# Patient Record
Sex: FEMALE | Race: WHITE | NOT HISPANIC OR LATINO
[De-identification: names, ages, dates, MRNs, and addresses within clinical notes are randomized per-mention and may not be internally consistent; named-entity substitution may affect disease eponyms.]

---

## 2018-02-15 ENCOUNTER — APPOINTMENT (OUTPATIENT)
Dept: SURGERY | Facility: CLINIC | Age: 83
End: 2018-02-15
Payer: MEDICARE

## 2018-02-15 VITALS
SYSTOLIC BLOOD PRESSURE: 153 MMHG | BODY MASS INDEX: 20.38 KG/M2 | HEIGHT: 63 IN | HEART RATE: 69 BPM | DIASTOLIC BLOOD PRESSURE: 79 MMHG | WEIGHT: 115 LBS | TEMPERATURE: 97.9 F

## 2018-02-15 PROCEDURE — 99203 OFFICE O/P NEW LOW 30 MIN: CPT

## 2018-03-01 ENCOUNTER — APPOINTMENT (OUTPATIENT)
Dept: SURGERY | Facility: CLINIC | Age: 83
End: 2018-03-01
Payer: MEDICARE

## 2018-03-01 ENCOUNTER — OUTPATIENT (OUTPATIENT)
Dept: OUTPATIENT SERVICES | Facility: HOSPITAL | Age: 83
LOS: 1 days | End: 2018-03-01
Payer: MEDICAID

## 2018-03-01 VITALS
SYSTOLIC BLOOD PRESSURE: 109 MMHG | HEIGHT: 63 IN | WEIGHT: 115 LBS | HEART RATE: 68 BPM | DIASTOLIC BLOOD PRESSURE: 67 MMHG | OXYGEN SATURATION: 98 % | TEMPERATURE: 98.6 F | BODY MASS INDEX: 20.38 KG/M2

## 2018-03-01 PROCEDURE — G9001: CPT

## 2018-03-01 PROCEDURE — 99213 OFFICE O/P EST LOW 20 MIN: CPT

## 2018-03-09 ENCOUNTER — OUTPATIENT (OUTPATIENT)
Dept: OUTPATIENT SERVICES | Facility: HOSPITAL | Age: 83
LOS: 1 days | End: 2018-03-09
Payer: MEDICARE

## 2018-03-09 VITALS
HEART RATE: 81 BPM | TEMPERATURE: 98 F | SYSTOLIC BLOOD PRESSURE: 155 MMHG | RESPIRATION RATE: 18 BRPM | OXYGEN SATURATION: 98 % | HEIGHT: 60 IN | DIASTOLIC BLOOD PRESSURE: 77 MMHG

## 2018-03-09 DIAGNOSIS — C50.912 MALIGNANT NEOPLASM OF UNSPECIFIED SITE OF LEFT FEMALE BREAST: ICD-10-CM

## 2018-03-09 DIAGNOSIS — E78.5 HYPERLIPIDEMIA, UNSPECIFIED: ICD-10-CM

## 2018-03-09 DIAGNOSIS — Z01.818 ENCOUNTER FOR OTHER PREPROCEDURAL EXAMINATION: ICD-10-CM

## 2018-03-09 DIAGNOSIS — I10 ESSENTIAL (PRIMARY) HYPERTENSION: ICD-10-CM

## 2018-03-09 DIAGNOSIS — E89.0 POSTPROCEDURAL HYPOTHYROIDISM: Chronic | ICD-10-CM

## 2018-03-09 DIAGNOSIS — C50.012 MALIGNANT NEOPLASM OF NIPPLE AND AREOLA, LEFT FEMALE BREAST: ICD-10-CM

## 2018-03-09 DIAGNOSIS — E03.9 HYPOTHYROIDISM, UNSPECIFIED: ICD-10-CM

## 2018-03-09 DIAGNOSIS — N63.10 UNSPECIFIED LUMP IN THE RIGHT BREAST, UNSPECIFIED QUADRANT: ICD-10-CM

## 2018-03-09 LAB — BLD GP AB SCN SERPL QL: SIGNIFICANT CHANGE UP

## 2018-03-09 PROCEDURE — G0463: CPT

## 2018-03-09 PROCEDURE — 86900 BLOOD TYPING SEROLOGIC ABO: CPT

## 2018-03-09 PROCEDURE — 86901 BLOOD TYPING SEROLOGIC RH(D): CPT

## 2018-03-09 PROCEDURE — 86850 RBC ANTIBODY SCREEN: CPT

## 2018-03-09 NOTE — H&P PST ADULT - PRO PAIN LIFE ADAPT
----- Message from Samira Garcia MD sent at 3/9/2018 10:34 AM CST -----  Ready for chemo school and chemo with [facility] for stage 3 DLBCL  Thanks    decreased activity level

## 2018-03-09 NOTE — H&P PST ADULT - NEGATIVE CARDIOVASCULAR SYMPTOMS
no orthopnea/no paroxysmal nocturnal dyspnea/no peripheral edema/no claudication/no palpitations/no chest pain/no dyspnea on exertion

## 2018-03-09 NOTE — H&P PST ADULT - NEGATIVE GASTROINTESTINAL SYMPTOMS
no nausea/no change in bowel habits/no flatulence/no abdominal pain/no vomiting/no diarrhea/no constipation

## 2018-03-09 NOTE — H&P PST ADULT - GASTROINTESTINAL DETAILS
nontender/bowel sounds normal/no rebound tenderness/no distention/normal/no bruit/no masses palpable/soft

## 2018-03-09 NOTE — H&P PST ADULT - RS GEN PE MLT RESP DETAILS PC
breath sounds equal/no rales/clear to auscultation bilaterally/no chest wall tenderness/no rhonchi/normal/respirations non-labored/no wheezes/no subcutaneous emphysema/no intercostal retractions/good air movement/airway patent

## 2018-03-09 NOTE — H&P PST ADULT - HISTORY OF PRESENT ILLNESS
84 yr old [place] female with PMH of Hypertension, hyperlipidemia, hypothyroidism due to total thyroidectomy in 2007 secondary to thyroid cancer presents with c/o left breast cancer and right breast mass. Pt reports left breast mass pain with palpation. Pt for left breast biopsy, possible modified radical left breast mastectomy and excision of right breast mass with spot localization on 3/14/2018.

## 2018-03-09 NOTE — H&P PST ADULT - PMH
HLD (hyperlipidemia)    HTN (hypertension)    Hypothyroidism (acquired)    Papillary thyroid carcinoma Breast cancer, left    HLD (hyperlipidemia)    HTN (hypertension)    Hypothyroidism (acquired)    Papillary thyroid carcinoma Breast cancer, left    Breast mass, right    HLD (hyperlipidemia)    HTN (hypertension)    Hypothyroidism (acquired)    Papillary thyroid carcinoma

## 2018-03-09 NOTE — H&P PST ADULT - PROBLEM SELECTOR PLAN 4
Continue synthroid and take with sips of water on day of surgery preop. Follow-up with PCP postop for thyroid management.

## 2018-03-09 NOTE — H&P PST ADULT - PROBLEM SELECTOR PLAN 3
Continue antihypertensive med and take with sips of water on day of surgery preop. Follow-up with PCP postop for BP management

## 2018-03-09 NOTE — H&P PST ADULT - NEGATIVE SKIN SYMPTOMS
no rash/no change in size/color of mole/no pitted nails/no dryness/no tumor/no brittle nails/no hair loss/no itching

## 2018-03-09 NOTE — H&P PST ADULT - ASSESSMENT
84 yr old [place]aur[place] female with PMH of Hypertension, hyperlipidemia, hypothyroidism due to total thyroidectomy in 2007 secondary to thyroid cancer presents with left breast cancer and right breast mass. Pt is scheduled for left breast biopsy, possible modified radical left breast mastectomy and excision of right breast mass with spot localization on 3/14/2018. Pt is at moderate risk for procedure.

## 2018-03-14 ENCOUNTER — TRANSCRIPTION ENCOUNTER (OUTPATIENT)
Age: 83
End: 2018-03-14

## 2018-03-14 ENCOUNTER — OUTPATIENT (OUTPATIENT)
Dept: OUTPATIENT SERVICES | Facility: HOSPITAL | Age: 83
LOS: 1 days | End: 2018-03-14
Payer: MEDICARE

## 2018-03-14 ENCOUNTER — RESULT REVIEW (OUTPATIENT)
Age: 83
End: 2018-03-14

## 2018-03-14 VITALS
DIASTOLIC BLOOD PRESSURE: 60 MMHG | RESPIRATION RATE: 18 BRPM | TEMPERATURE: 97 F | SYSTOLIC BLOOD PRESSURE: 117 MMHG | OXYGEN SATURATION: 100 % | HEART RATE: 70 BPM

## 2018-03-14 VITALS
TEMPERATURE: 98 F | HEART RATE: 68 BPM | OXYGEN SATURATION: 99 % | RESPIRATION RATE: 17 BRPM | HEIGHT: 60 IN | DIASTOLIC BLOOD PRESSURE: 68 MMHG | SYSTOLIC BLOOD PRESSURE: 144 MMHG

## 2018-03-14 DIAGNOSIS — E89.0 POSTPROCEDURAL HYPOTHYROIDISM: Chronic | ICD-10-CM

## 2018-03-14 DIAGNOSIS — C50.012 MALIGNANT NEOPLASM OF NIPPLE AND AREOLA, LEFT FEMALE BREAST: ICD-10-CM

## 2018-03-14 DIAGNOSIS — Z01.818 ENCOUNTER FOR OTHER PREPROCEDURAL EXAMINATION: ICD-10-CM

## 2018-03-14 PROCEDURE — 88331 PATH CONSLTJ SURG 1 BLK 1SPC: CPT

## 2018-03-14 PROCEDURE — 76098 X-RAY EXAM SURGICAL SPECIMEN: CPT | Mod: 26

## 2018-03-14 PROCEDURE — 19120 REMOVAL OF BREAST LESION: CPT | Mod: 50

## 2018-03-14 PROCEDURE — 19285 PERQ DEV BREAST 1ST US IMAG: CPT | Mod: RT

## 2018-03-14 PROCEDURE — C1889: CPT

## 2018-03-14 PROCEDURE — 88307 TISSUE EXAM BY PATHOLOGIST: CPT | Mod: 26

## 2018-03-14 PROCEDURE — 19281 PERQ DEVICE BREAST 1ST IMAG: CPT

## 2018-03-14 PROCEDURE — 88305 TISSUE EXAM BY PATHOLOGIST: CPT | Mod: 26

## 2018-03-14 PROCEDURE — 88334 PATH CONSLTJ SURG CYTO XM EA: CPT | Mod: 26,59

## 2018-03-14 PROCEDURE — 19120 REMOVAL OF BREAST LESION: CPT | Mod: LT

## 2018-03-14 PROCEDURE — 76098 X-RAY EXAM SURGICAL SPECIMEN: CPT

## 2018-03-14 PROCEDURE — 88331 PATH CONSLTJ SURG 1 BLK 1SPC: CPT | Mod: 26

## 2018-03-14 PROCEDURE — 19285 PERQ DEV BREAST 1ST US IMAG: CPT

## 2018-03-14 PROCEDURE — 88333 PATH CONSLTJ SURG CYTO XM 1: CPT

## 2018-03-14 PROCEDURE — 19125 EXCISION BREAST LESION: CPT | Mod: RT

## 2018-03-14 PROCEDURE — 88305 TISSUE EXAM BY PATHOLOGIST: CPT

## 2018-03-14 PROCEDURE — 88307 TISSUE EXAM BY PATHOLOGIST: CPT

## 2018-03-14 RX ORDER — ONDANSETRON 8 MG/1
4 TABLET, FILM COATED ORAL EVERY 6 HOURS
Qty: 0 | Refills: 0 | Status: DISCONTINUED | OUTPATIENT
Start: 2018-03-14 | End: 2018-03-22

## 2018-03-14 RX ORDER — IBUPROFEN 200 MG
400 TABLET ORAL EVERY 8 HOURS
Qty: 0 | Refills: 0 | Status: DISCONTINUED | OUTPATIENT
Start: 2018-03-14 | End: 2018-03-22

## 2018-03-14 RX ORDER — SODIUM CHLORIDE 9 MG/ML
3 INJECTION INTRAMUSCULAR; INTRAVENOUS; SUBCUTANEOUS EVERY 8 HOURS
Qty: 0 | Refills: 0 | Status: DISCONTINUED | OUTPATIENT
Start: 2018-03-14 | End: 2018-03-14

## 2018-03-14 RX ORDER — HYDROMORPHONE HYDROCHLORIDE 2 MG/ML
0.5 INJECTION INTRAMUSCULAR; INTRAVENOUS; SUBCUTANEOUS
Qty: 0 | Refills: 0 | Status: DISCONTINUED | OUTPATIENT
Start: 2018-03-14 | End: 2018-03-14

## 2018-03-14 RX ORDER — SODIUM CHLORIDE 9 MG/ML
1000 INJECTION, SOLUTION INTRAVENOUS
Qty: 0 | Refills: 0 | Status: DISCONTINUED | OUTPATIENT
Start: 2018-03-14 | End: 2018-03-14

## 2018-03-14 RX ORDER — ONDANSETRON 8 MG/1
4 TABLET, FILM COATED ORAL ONCE
Qty: 0 | Refills: 0 | Status: DISCONTINUED | OUTPATIENT
Start: 2018-03-14 | End: 2018-03-14

## 2018-03-14 RX ORDER — OXYCODONE AND ACETAMINOPHEN 5; 325 MG/1; MG/1
1 TABLET ORAL EVERY 6 HOURS
Qty: 0 | Refills: 0 | Status: DISCONTINUED | OUTPATIENT
Start: 2018-03-14 | End: 2018-03-14

## 2018-03-14 NOTE — BRIEF OPERATIVE NOTE - POST-OP DX
Breast cyst, left  03/14/2018    Active  Aristeo Javier  Breast lesion on mammography  03/14/2018    Active  Aristeo Javier

## 2018-03-14 NOTE — ASU PATIENT PROFILE, ADULT - PMH
Breast cancer, left    Breast mass, right    HLD (hyperlipidemia)    HTN (hypertension)    Hypothyroidism (acquired)    Papillary thyroid carcinoma

## 2018-03-14 NOTE — ASU DISCHARGE PLAN (ADULT/PEDIATRIC). - NOTIFY
Fever greater than 101/Unable to Urinate/Inability to Tolerate Liquids or Foods/Numbness, color, or temperature change to extremity/Pain not relieved by Medications/Bleeding that does not stop/Swelling that continues/Persistent Nausea and Vomiting

## 2018-03-14 NOTE — ASU DISCHARGE PLAN (ADULT/PEDIATRIC). - MEDICATION SUMMARY - MEDICATIONS TO TAKE
I will START or STAY ON the medications listed below when I get home from the hospital:    Aspirin Enteric Coated 81 mg oral delayed release tablet  -- 1 tab(s) by mouth once a day  -- Indication: For As previously prescribed    Tylenol 325 mg oral tablet  -- 2 tab(s) by mouth every 4 hours, As Needed  -- Indication: For As previously prescribed    atorvastatin 10 mg oral tablet  -- 1 tab(s) by mouth once a day  -- Indication: For As previously prescribed    Metoprolol Succinate ER 25 mg oral tablet, extended release  -- 1 tab(s) by mouth once a day  -- Indication: For As previously prescribed    Synthroid 125 mcg (0.125 mg) oral tablet  -- 1 tab(s) by mouth once a day  -- Indication: For As previously prescribed

## 2018-03-14 NOTE — ASU DISCHARGE PLAN (ADULT/PEDIATRIC). - NURSING INSTRUCTIONS
Keep follow up appointment. Keep follow up appointment. keep dressing dry, clean, intact, for 2 days. After 2 days, remove dressing and leave steri strips on. You can shower with steri strips on. If it fall off after shower is OK, if not you leave it there, it will fall off by itself in 2-3 days.

## 2018-03-14 NOTE — BRIEF OPERATIVE NOTE - PROCEDURE
<<-----Click on this checkbox to enter Procedure Biopsy of breast with frozen section  03/14/2018  Left  Active  CFUNFGELD  Lumpectomy of breast with needle localization  03/14/2018  Right  Active  CFUNFGELD

## 2018-03-15 ENCOUNTER — APPOINTMENT (OUTPATIENT)
Dept: SURGERY | Facility: CLINIC | Age: 83
End: 2018-03-15
Payer: MEDICARE

## 2018-03-15 ENCOUNTER — EMERGENCY (EMERGENCY)
Facility: HOSPITAL | Age: 83
LOS: 1 days | Discharge: HOME | End: 2018-03-15
Attending: EMERGENCY MEDICINE
Payer: MEDICARE

## 2018-03-15 VITALS
BODY MASS INDEX: 20.38 KG/M2 | SYSTOLIC BLOOD PRESSURE: 124 MMHG | OXYGEN SATURATION: 100 % | HEIGHT: 63 IN | DIASTOLIC BLOOD PRESSURE: 76 MMHG | TEMPERATURE: 98 F | WEIGHT: 115 LBS | HEART RATE: 91 BPM

## 2018-03-15 VITALS
WEIGHT: 130.07 LBS | RESPIRATION RATE: 16 BRPM | SYSTOLIC BLOOD PRESSURE: 152 MMHG | TEMPERATURE: 98 F | HEIGHT: 62 IN | DIASTOLIC BLOOD PRESSURE: 84 MMHG | OXYGEN SATURATION: 99 % | HEART RATE: 67 BPM

## 2018-03-15 VITALS
TEMPERATURE: 99 F | RESPIRATION RATE: 18 BRPM | SYSTOLIC BLOOD PRESSURE: 145 MMHG | OXYGEN SATURATION: 99 % | HEART RATE: 65 BPM | DIASTOLIC BLOOD PRESSURE: 78 MMHG

## 2018-03-15 DIAGNOSIS — T81.31XA DISRUPTION OF EXTERNAL OPERATION (SURGICAL) WOUND, NOT ELSEWHERE CLASSIFIED, INITIAL ENCOUNTER: ICD-10-CM

## 2018-03-15 DIAGNOSIS — E89.0 POSTPROCEDURAL HYPOTHYROIDISM: Chronic | ICD-10-CM

## 2018-03-15 PROCEDURE — 99284 EMERGENCY DEPT VISIT MOD MDM: CPT

## 2018-03-15 PROCEDURE — 99283 EMERGENCY DEPT VISIT LOW MDM: CPT | Mod: 25

## 2018-03-15 PROCEDURE — 99024 POSTOP FOLLOW-UP VISIT: CPT

## 2018-03-15 RX ORDER — CEPHALEXIN 500 MG
1 CAPSULE ORAL
Qty: 14 | Refills: 0 | OUTPATIENT
Start: 2018-03-15 | End: 2018-03-21

## 2018-03-15 RX ORDER — CEPHALEXIN 500 MG
500 CAPSULE ORAL ONCE
Qty: 0 | Refills: 0 | Status: COMPLETED | OUTPATIENT
Start: 2018-03-15 | End: 2018-03-15

## 2018-03-15 RX ADMIN — Medication 500 MILLIGRAM(S): at 01:46

## 2018-03-15 NOTE — ED PROVIDER NOTE - PROGRESS NOTE DETAILS
Dr. Lee from surgery evaluated patient recommend no sutures at this time, keflex to prevent infection, and f/u with Dr. Dowell in office later today or tomorrow.   Discussed plan with patient and family. Patient stable for discharge.

## 2018-03-15 NOTE — ED ADULT TRIAGE NOTE - CHIEF COMPLAINT QUOTE
She had right breast surgery done today, patient pulled out sutures from surgical site, wound is open and bleeding

## 2018-03-15 NOTE — ED PROVIDER NOTE - OBJECTIVE STATEMENT
83 y/o F pt with PMHx of breast cancer, HTN, HLD, hypothyroid, PSHx POD # 0 s/p right lumpectomy, presents to ED after surgicals suture fell out. Pt reports that she was discharged home when she removed her dressing and showed. Pt states she noticed bleeding from the surgical wound. Pt denies fever, chills, redness, discharge,  or any other complaints. NKDA.

## 2018-03-15 NOTE — CONSULT NOTE ADULT - PROBLEM SELECTOR RECOMMENDATION 9
S/p Bilateral breast lumpectomy  Left breast incision intact  Right breast with small opening, no active bleed.    Bilateral incision cleansed with Chlorhexidine  Pressure dressings applied and taped bilaterally.  Keflex for prophylaxis.  Follow up Dr Dowell tomorrow.

## 2018-03-15 NOTE — ED PROVIDER NOTE - MEDICAL DECISION MAKING DETAILS
85 y/o F pt POD #0 after surgical suture fell out after breast lumpectomy. Will consult surgery and wound management.

## 2018-03-15 NOTE — CONSULT NOTE ADULT - SUBJECTIVE AND OBJECTIVE BOX
85 y/o F pt with PMHx of breast cancer, HTN, HLD, hypothyroid, PSHx POD # 0 s/p right and left lumpectomy, presents to ED after surgicals dressings were unintentionally removed.   Pt reports that she was discharged home when she removed her dressing and showed. Pt states she noticed bleeding from the surgical wound. Pt denies fever, chills, redness, discharge,  or any other complaints. NKDA.  Seen with family.      PAST MEDICAL & SURGICAL HISTORY:  Breast mass, right  Breast cancer, left  Hypothyroidism (acquired)  HLD (hyperlipidemia)  HTN (hypertension)  Papillary thyroid carcinoma  H/O total thyroidectomy: with central neck dissection on 11/26/2007      MEDICATIONS  (STANDING):    MEDICATIONS  (PRN):      Allergies    No Known Allergies    Intolerances        SOCIAL HISTORY:  No drug use    FAMILY HISTORY:  No pertinent family history in first degree relatives      REVIEW OF SYSTEMS:  CONSTITUTIONAL: In no acute distress.  EYES: No eye pain, visual disturbances, or discharge  ENMT:  No difficulty hearing, tinnitus, vertigo; No sinus or throat pain  NECK: No pain or stiffness  BREASTS: breast incisions  RESPIRATORY: No cough, wheezing, chills or hemoptysis; No shortness of breath  CARDIOVASCULAR: No chest pain, palpitations, dizziness, or leg swelling  GASTROINTESTINAL: No abdominal or epigastric pain. No nausea, vomiting, or hematemesis; No diarrhea or constipation. No melena or hematochezia.  GENITOURINARY: No dysuria, frequency, hematuria, or incontinence  NEUROLOGICAL: No headaches, memory loss, loss of strength, numbness, or tremors  SKIN: No itching, burning, rashes, or lesions   LYMPH NODES: No enlarged glands  ENDOCRINE: No heat or cold intolerance; No hair loss  MUSCULOSKELETAL: No joint pain or swelling; No muscle, back, or extremity pain  PSYCHIATRIC: No depression, anxiety, mood swings, or difficulty sleeping  HEME/LYMPH: No easy bruising, or bleeding gums  ALLERGY AND IMMUNOLOGIC: No hives or eczema      Vital Signs Last 24 Hrs  T(C): 36.7 (15 Mar 2018 00:06), Max: 36.7 (14 Mar 2018 14:32)  T(F): 98 (15 Mar 2018 00:06), Max: 98 (14 Mar 2018 14:32)  HR: 67 (15 Mar 2018 00:06) (67 - 97)  BP: 152/84 (15 Mar 2018 00:06) (112/58 - 152/84)  BP(mean): 72 (14 Mar 2018 16:23) (72 - 72)  RR: 16 (15 Mar 2018 00:06) (14 - 23)  SpO2: 99% (15 Mar 2018 00:06) (96% - 100%)    Daily Height in cm: 157.48 (15 Mar 2018 00:06)    Daily     PHYSICAL EXAM:  GENERAL: NAD, well-groomed, well-developed  HEAD:  Atraumatic, Normocephalic  EYES: EOMI, PERRL, conjunctiva and sclera clear  ENMT: Moist mucous membranes, Good dentition, No lesions  NECK: Supple, No JVD  NERVOUS SYSTEM:  Alert & Oriented X3, Good concentration  CHEST/LUNG: Clear to percussion bilaterally; Normal respiratory effort  HEART: Regular rate and rhythm  ABDOMEN: Soft, Nontender, Nondistended; Bowel sounds present  : normal external genitalia  BREASTS: left breast incision intact, no bleeding; right breast incision with small opening, no active bleeding.  EXTREMITIES:  No clubbing, cyanosis, or edema  VASC: equal peripheral pulses  LYMPH: No lymphadenopathy noted  SKIN: No rashes or lesions  PSYCH: normal affect    LABS:    Neutrophil %:               RADIOLOGY & ADDITIONAL STUDIES:

## 2018-03-20 PROBLEM — Z86.39 HISTORY OF THYROID DISORDER: Status: RESOLVED | Noted: 2018-02-15 | Resolved: 2018-03-20

## 2018-03-20 PROBLEM — Z86.79 HISTORY OF HYPERTENSION: Status: RESOLVED | Noted: 2018-02-15 | Resolved: 2018-03-20

## 2018-03-20 PROBLEM — Z86.39 HISTORY OF HIGH CHOLESTEROL: Status: RESOLVED | Noted: 2018-02-15 | Resolved: 2018-03-20

## 2018-03-20 RX ORDER — LEVOTHYROXINE SODIUM 137 UG/1
TABLET ORAL
Refills: 0 | Status: ACTIVE | COMMUNITY

## 2018-03-20 RX ORDER — ATORVASTATIN CALCIUM 80 MG/1
TABLET, FILM COATED ORAL
Refills: 0 | Status: ACTIVE | COMMUNITY

## 2018-03-20 RX ORDER — METOPROLOL TARTRATE 75 MG/1
TABLET, FILM COATED ORAL
Refills: 0 | Status: ACTIVE | COMMUNITY

## 2018-03-22 ENCOUNTER — APPOINTMENT (OUTPATIENT)
Dept: SURGERY | Facility: CLINIC | Age: 83
End: 2018-03-22
Payer: MEDICARE

## 2018-03-22 VITALS
SYSTOLIC BLOOD PRESSURE: 142 MMHG | HEART RATE: 75 BPM | BODY MASS INDEX: 20.38 KG/M2 | WEIGHT: 115 LBS | DIASTOLIC BLOOD PRESSURE: 82 MMHG | HEIGHT: 63 IN | TEMPERATURE: 98.5 F | OXYGEN SATURATION: 98 %

## 2018-03-22 DIAGNOSIS — Z86.39 PERSONAL HISTORY OF OTHER ENDOCRINE, NUTRITIONAL AND METABOLIC DISEASE: ICD-10-CM

## 2018-03-22 DIAGNOSIS — Z86.79 PERSONAL HISTORY OF OTHER DISEASES OF THE CIRCULATORY SYSTEM: ICD-10-CM

## 2018-03-22 PROCEDURE — 99024 POSTOP FOLLOW-UP VISIT: CPT

## 2018-03-23 LAB — SURGICAL PATHOLOGY FINAL REPORT - CH: SIGNIFICANT CHANGE UP

## 2018-04-12 DIAGNOSIS — R69 ILLNESS, UNSPECIFIED: ICD-10-CM

## 2018-04-16 ENCOUNTER — APPOINTMENT (OUTPATIENT)
Dept: SURGERY | Facility: CLINIC | Age: 83
End: 2018-04-16
Payer: MEDICARE

## 2018-04-16 DIAGNOSIS — Z87.898 PERSONAL HISTORY OF OTHER SPECIFIED CONDITIONS: ICD-10-CM

## 2018-04-16 PROCEDURE — 99024 POSTOP FOLLOW-UP VISIT: CPT

## 2018-04-27 ENCOUNTER — OUTPATIENT (OUTPATIENT)
Dept: OUTPATIENT SERVICES | Facility: HOSPITAL | Age: 83
LOS: 1 days | End: 2018-04-27
Payer: MEDICARE

## 2018-04-27 VITALS
HEIGHT: 60 IN | DIASTOLIC BLOOD PRESSURE: 84 MMHG | HEART RATE: 68 BPM | OXYGEN SATURATION: 98 % | WEIGHT: 111.99 LBS | TEMPERATURE: 98 F | SYSTOLIC BLOOD PRESSURE: 147 MMHG | RESPIRATION RATE: 18 BRPM

## 2018-04-27 DIAGNOSIS — E78.5 HYPERLIPIDEMIA, UNSPECIFIED: ICD-10-CM

## 2018-04-27 DIAGNOSIS — E03.9 HYPOTHYROIDISM, UNSPECIFIED: ICD-10-CM

## 2018-04-27 DIAGNOSIS — I10 ESSENTIAL (PRIMARY) HYPERTENSION: ICD-10-CM

## 2018-04-27 DIAGNOSIS — N63.0 UNSPECIFIED LUMP IN UNSPECIFIED BREAST: Chronic | ICD-10-CM

## 2018-04-27 DIAGNOSIS — N60.01 SOLITARY CYST OF RIGHT BREAST: ICD-10-CM

## 2018-04-27 DIAGNOSIS — Z01.818 ENCOUNTER FOR OTHER PREPROCEDURAL EXAMINATION: ICD-10-CM

## 2018-04-27 DIAGNOSIS — L81.9 DISORDER OF PIGMENTATION, UNSPECIFIED: ICD-10-CM

## 2018-04-27 DIAGNOSIS — E89.0 POSTPROCEDURAL HYPOTHYROIDISM: Chronic | ICD-10-CM

## 2018-04-27 PROCEDURE — G0463: CPT

## 2018-04-27 NOTE — H&P PST ADULT - PROBLEM SELECTOR PLAN 2
Continue antihypertensive meds and take with sips of water on day of surgery preop. Follow-up with PCP postop for BP management

## 2018-04-27 NOTE — H&P PST ADULT - HISTORY OF PRESENT ILLNESS
84 yr old female with PMH of Hypertension, hyperlipidemia, hypothyroidism due to total thyroidectomy in 2007 secondary to thyroid cancer presents with c/o pigmented lesion right breast. Pt for excision of right breast pigmented lesion on 5/3/2018.

## 2018-04-27 NOTE — H&P PST ADULT - NEGATIVE GASTROINTESTINAL SYMPTOMS
no change in bowel habits/no melena/no flatulence/no nausea/no diarrhea/no constipation/no abdominal pain/no vomiting

## 2018-04-27 NOTE — H&P PST ADULT - PMH
Breast cancer, left    Breast mass    Breast mass, right    HLD (hyperlipidemia)    HTN (hypertension)    Hypothyroidism (acquired)    Papillary thyroid carcinoma Breast cancer, left    Breast mass    Breast mass, right    HLD (hyperlipidemia)    HTN (hypertension)    Hypothyroidism (acquired)    Papillary thyroid carcinoma    Pigmented skin lesion  right breast

## 2018-04-27 NOTE — H&P PST ADULT - RS GEN PE MLT RESP DETAILS PC
clear to auscultation bilaterally/breath sounds equal/no rhonchi/no subcutaneous emphysema/no wheezes/good air movement/no chest wall tenderness/normal/airway patent/respirations non-labored/no intercostal retractions/no rales

## 2018-04-27 NOTE — H&P PST ADULT - PSH
Breast mass  Needle biopsy left breast mass and excision of left breast mass and excision of right breast mass spot localized on 3/14/2018  H/O total thyroidectomy  with central neck dissection on 11/26/2007

## 2018-04-27 NOTE — H&P PST ADULT - PROBLEM SELECTOR PLAN 3
Continue synthroid and take with sips of water on day of surgery preop. Follow-up with PCP postop for thyroid management

## 2018-04-27 NOTE — H&P PST ADULT - ENT GEN HX ROS MEA POS PC
H/O aortic valve repair    H/O mitral valve repair    H/O thyroidectomy    History of cholecystectomy    S/P CABG x 3 hearing difficulty

## 2018-04-27 NOTE — H&P PST ADULT - NEGATIVE CARDIOVASCULAR SYMPTOMS
no orthopnea/no peripheral edema/no chest pain/no paroxysmal nocturnal dyspnea/no claudication/no palpitations/no dyspnea on exertion

## 2018-04-27 NOTE — H&P PST ADULT - ASSESSMENT
84 yr old female with PMH of Hypertension, hyperlipidemia, hypothyroidism due to total thyroidectomy in 2007 secondary to thyroid cancer presents with pigmented lesion right breast. Pt for excision of right breast pigmented lesion on 5/3/2018. Pt is at low risk for procedure.

## 2018-05-02 ENCOUNTER — TRANSCRIPTION ENCOUNTER (OUTPATIENT)
Age: 83
End: 2018-05-02

## 2018-05-03 ENCOUNTER — OUTPATIENT (OUTPATIENT)
Dept: OUTPATIENT SERVICES | Facility: HOSPITAL | Age: 83
LOS: 1 days | End: 2018-05-03
Payer: MEDICARE

## 2018-05-03 ENCOUNTER — RESULT REVIEW (OUTPATIENT)
Age: 83
End: 2018-05-03

## 2018-05-03 VITALS
SYSTOLIC BLOOD PRESSURE: 138 MMHG | DIASTOLIC BLOOD PRESSURE: 64 MMHG | OXYGEN SATURATION: 100 % | RESPIRATION RATE: 16 BRPM | HEART RATE: 73 BPM | TEMPERATURE: 98 F

## 2018-05-03 VITALS
SYSTOLIC BLOOD PRESSURE: 152 MMHG | WEIGHT: 111.99 LBS | HEART RATE: 67 BPM | TEMPERATURE: 98 F | RESPIRATION RATE: 16 BRPM | OXYGEN SATURATION: 97 % | DIASTOLIC BLOOD PRESSURE: 70 MMHG

## 2018-05-03 DIAGNOSIS — E89.0 POSTPROCEDURAL HYPOTHYROIDISM: Chronic | ICD-10-CM

## 2018-05-03 DIAGNOSIS — N63.0 UNSPECIFIED LUMP IN UNSPECIFIED BREAST: Chronic | ICD-10-CM

## 2018-05-03 DIAGNOSIS — Z01.818 ENCOUNTER FOR OTHER PREPROCEDURAL EXAMINATION: ICD-10-CM

## 2018-05-03 DIAGNOSIS — N60.01 SOLITARY CYST OF RIGHT BREAST: ICD-10-CM

## 2018-05-03 PROCEDURE — 88305 TISSUE EXAM BY PATHOLOGIST: CPT

## 2018-05-03 PROCEDURE — 19120 REMOVAL OF BREAST LESION: CPT | Mod: RT

## 2018-05-03 PROCEDURE — 11402 EXC TR-EXT B9+MARG 1.1-2 CM: CPT | Mod: 58,RT

## 2018-05-03 PROCEDURE — 88305 TISSUE EXAM BY PATHOLOGIST: CPT | Mod: 26

## 2018-05-03 RX ORDER — ASPIRIN/CALCIUM CARB/MAGNESIUM 324 MG
1 TABLET ORAL
Qty: 0 | Refills: 0 | COMMUNITY

## 2018-05-03 RX ORDER — ONDANSETRON 8 MG/1
4 TABLET, FILM COATED ORAL EVERY 6 HOURS
Qty: 0 | Refills: 0 | Status: DISCONTINUED | OUTPATIENT
Start: 2018-05-03 | End: 2018-05-11

## 2018-05-03 RX ORDER — HYDROMORPHONE HYDROCHLORIDE 2 MG/ML
0.25 INJECTION INTRAMUSCULAR; INTRAVENOUS; SUBCUTANEOUS
Qty: 0 | Refills: 0 | Status: DISCONTINUED | OUTPATIENT
Start: 2018-05-03 | End: 2018-05-03

## 2018-05-03 RX ORDER — SODIUM CHLORIDE 9 MG/ML
1000 INJECTION, SOLUTION INTRAVENOUS
Qty: 0 | Refills: 0 | Status: DISCONTINUED | OUTPATIENT
Start: 2018-05-03 | End: 2018-05-03

## 2018-05-03 RX ORDER — LEVOTHYROXINE SODIUM 125 MCG
1 TABLET ORAL
Qty: 0 | Refills: 0 | COMMUNITY

## 2018-05-03 RX ORDER — SODIUM CHLORIDE 9 MG/ML
3 INJECTION INTRAMUSCULAR; INTRAVENOUS; SUBCUTANEOUS EVERY 8 HOURS
Qty: 0 | Refills: 0 | Status: DISCONTINUED | OUTPATIENT
Start: 2018-05-03 | End: 2018-05-03

## 2018-05-03 RX ORDER — OXYCODONE AND ACETAMINOPHEN 5; 325 MG/1; MG/1
1 TABLET ORAL EVERY 4 HOURS
Qty: 0 | Refills: 0 | Status: DISCONTINUED | OUTPATIENT
Start: 2018-05-03 | End: 2018-05-03

## 2018-05-03 RX ORDER — ONDANSETRON 8 MG/1
4 TABLET, FILM COATED ORAL ONCE
Qty: 0 | Refills: 0 | Status: DISCONTINUED | OUTPATIENT
Start: 2018-05-03 | End: 2018-05-03

## 2018-05-03 RX ORDER — ACETAMINOPHEN 500 MG
2 TABLET ORAL
Qty: 0 | Refills: 0 | COMMUNITY

## 2018-05-03 RX ORDER — ATORVASTATIN CALCIUM 80 MG/1
1 TABLET, FILM COATED ORAL
Qty: 0 | Refills: 0 | COMMUNITY

## 2018-05-03 RX ORDER — METOPROLOL TARTRATE 50 MG
1 TABLET ORAL
Qty: 0 | Refills: 0 | COMMUNITY

## 2018-05-03 RX ADMIN — SODIUM CHLORIDE 3 MILLILITER(S): 9 INJECTION INTRAMUSCULAR; INTRAVENOUS; SUBCUTANEOUS at 08:06

## 2018-05-03 NOTE — ASU DISCHARGE PLAN (ADULT/PEDIATRIC). - PAIN
Please take Tylenol or Motrin over the counter as needed every 6 hours and as directed on the back of the bottle./n/a

## 2018-05-03 NOTE — ASU DISCHARGE PLAN (ADULT/PEDIATRIC). - MEDICATION SUMMARY - MEDICATIONS TO TAKE
I will START or STAY ON the medications listed below when I get home from the hospital:    Aspirin Enteric Coated 81 mg oral delayed release tablet  -- 1 tab(s) by mouth once a day  -- Indication: For As previously prescribed    atorvastatin 10 mg oral tablet  -- 1 tab(s) by mouth once a day  -- Indication: For As previously prescribed    Metoprolol Succinate ER 25 mg oral tablet, extended release  -- 1 tab(s) by mouth once a day  -- Indication: For As previously prescribed    Synthroid 125 mcg (0.125 mg) oral tablet  -- 1 tab(s) by mouth once a day except on Fridays  -- Indication: For As previously prescribed

## 2018-05-03 NOTE — BRIEF OPERATIVE NOTE - PROCEDURE
<<-----Click on this checkbox to enter Procedure Excision of mass of chest wall  05/03/2018    Active  CFUNFGELD

## 2018-05-03 NOTE — ASU DISCHARGE PLAN (ADULT/PEDIATRIC). - SPECIAL INSTRUCTIONS
Please take Tylenol or Motrin over the counter as needed every 6 hours and as directed on the back of the bottle.

## 2018-05-03 NOTE — ASU DISCHARGE PLAN (ADULT/PEDIATRIC). - NOTIFY
Inability to Tolerate Liquids or Foods/Numbness, color, or temperature change to extremity/Bleeding that does not stop/Fever greater than 101/Swelling that continues/Persistent Nausea and Vomiting/Unable to Urinate/Pain not relieved by Medications

## 2018-05-03 NOTE — ASU PATIENT PROFILE, ADULT - PMH
Breast cancer, left    Breast mass    Breast mass, right    HLD (hyperlipidemia)    HTN (hypertension)    Hypothyroidism (acquired)    Papillary thyroid carcinoma    Pigmented skin lesion  right breast

## 2018-05-04 LAB — SURGICAL PATHOLOGY FINAL REPORT - CH: SIGNIFICANT CHANGE UP

## 2018-05-07 PROBLEM — L81.9 PIGMENTED SKIN LESION: Status: RESOLVED | Noted: 2018-05-07 | Resolved: 2018-05-07

## 2018-05-14 ENCOUNTER — APPOINTMENT (OUTPATIENT)
Dept: SURGERY | Facility: CLINIC | Age: 83
End: 2018-05-14
Payer: MEDICARE

## 2018-05-14 DIAGNOSIS — L81.9 DISORDER OF PIGMENTATION, UNSPECIFIED: ICD-10-CM

## 2018-05-14 PROCEDURE — 99024 POSTOP FOLLOW-UP VISIT: CPT

## 2018-07-17 PROBLEM — C73 MALIGNANT NEOPLASM OF THYROID GLAND: Chronic | Status: ACTIVE | Noted: 2018-03-09

## 2023-09-06 NOTE — ASU PATIENT PROFILE, ADULT - VISION (WITH CORRECTIVE LENSES IF THE PATIENT USUALLY WEARS THEM):
----- Message from Layla Akhtar sent at 9/6/2023  3:24 PM CDT -----  Regarding: med refill  .Type:  RX Refill Request    Who Called: pt  Refill or New Rx:refill  RX Name and Strength:cefdinir (OMNICEF) 300 MG capsule  How is the patient currently taking it? (ex. 1XDay):2xday  Is this a 30 day or 90 day RX:28  Preferred Pharmacy with phone number:[address]   Phone: [phone]  Fax: [phone]  Local or Mail Order:local  Ordering Provider:Salomón  Would the patient rather a call back or a response via [facility]? Call back  Best Call Back Number:[phone]  Additional Information: med refill        Normal vision: sees adequately in most situations; can see medication labels, newsprint

## 2024-05-01 NOTE — H&P PST ADULT - NEGATIVE BREAST SYMPTOMS

## 2025-06-26 NOTE — H&P PST ADULT - SYMPTOMS
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 mg/g (HCC)    Chronic obstructive pulmonary disease (HCC)    Disc degeneration, lumbar    Tobacco abuse    Essential hypertension    Fall at home, initial encounter    Lung nodule    Left rib fracture       Past Medical History  Past Medical History[1]     Past Surgical History  Past Surgical History[2]      06/26/25 1209   PT Last Visit   PT Visit Date 06/26/25   Note Type   Note type Evaluation   Pain Assessment   Pain Assessment Tool 0-10   Pain Score No Pain  (0 pain at rest however pt reports pain in L shoulder during bed mobility)   Restrictions/Precautions   Weight Bearing Precautions Per Order No   Other Precautions Cognitive;Chair Alarm;Bed Alarm;Impulsive;Telemetry;Fall Risk;Pain;O2  (1 L O2(none at baseline))   Home Living   Type of Home House   Home Layout Two level;Bed/bath upstairs;Stairs to enter with rails  (6 STEVEN)   Bathroom Shower/Tub Tub/shower unit   Bathroom Toilet Standard   Bathroom Equipment   (denies)   Home Equipment   (denies)   Additional Comments pt lives with with daughter in 2 SH with 6 STEVEN and FFOS to bed/bath upstairs   Prior Function   Level of [place] Independent with functional mobility;Independent with ADLs;Needs assistance with IADLS  (shares IADLs with daughter)   Lives With Family;Daughter  (dtr works during the day)   Receives Help From Family   IADLs Independent with medication management;Independent with driving;Family/Friend/Other provides meals   Falls in the last 6 months 1 to 4  (1 fall leading to admission)   Vocational Retired   Comments pt reports independence with mobility and ADLs PTA   General   Family/Caregiver Present No   Cognition   Overall Cognitive Status Impaired    Arousal/Participation Responsive   Orientation Level Oriented X4   Memory Decreased recall of precautions   Following Commands Follows one step commands with increased time or repetition   Comments pt pleasant and cooperative   Subjective   Subjective pt agreeable to mobilize   RLE Assessment   RLE Assessment   (grossly 4/5 functionally)   LLE Assessment   LLE Assessment   (grossly 4/5 functionally)   Light Touch   RLE Light Touch   (reports nerve damage at baseline)   Bed Mobility   Supine to Sit 3  Moderate assistance   Additional items Assist x 1;HOB elevated;Increased time required;Verbal cues;LE management   Sit to Supine Unable to assess   Additional Comments pt OOB in chair at end of session. requires min A sitting EOB   Transfers   Sit to Stand 3  Moderate assistance   Additional items Assist x 1;Increased time required;Verbal cues   Stand to Sit 3  Moderate assistance   Additional items Assist x 1;Armrests;Increased time required;Verbal cues   Additional Comments HHA   Ambulation/Elevation   Gait pattern Improper Weight shift;Decreased foot clearance;Inconsistent roxanne;Short stride;Excessively slow   Gait Assistance 3  Moderate assist   Additional items Assist x 1;Verbal cues   Assistive Device Other (Comment)  (b/l HHA)   Distance 10'x2   Stair Management Assistance Not tested   Ambulation/Elevation Additional Comments unsteady, demonstrates difficulty initiating steps. SpO2 84-87% during mobility on RA, improves to >90% with 1L O2 and seated rest   Balance   Static Sitting Poor +   Dynamic Sitting Poor   Static Standing Poor   Dynamic Standing Poor   Ambulatory Poor   Endurance Deficit   Endurance Deficit Yes   Endurance Deficit Description pt limited by pain, fatigue and decreased activity tolerance   Activity Tolerance   Activity Tolerance Patient limited by fatigue;Patient limited by pain   Medical Staff Made Aware ALEJANDRO Matamoros   Nurse Made Aware yes-RN cleared   Assessment   Prognosis Fair    Problem List Decreased strength;Impaired balance;Decreased endurance;Decreased cognition;Decreased coordination;Decreased mobility;Impaired judgement;Decreased safety awareness;Pain   Assessment Pt is an 85 y.o. female presenting [facility] on [facility] for primary medical dx of generalized weakness. Pt  has a past medical history of Glaucoma and Hypertension. Pt presents as a high complexity evaluation due to Ongoing medical management for primary dx, Increased reliance on more restrictive AD compared to baseline, Decreased activity tolerance compared to baseline, Fall risk, Increased assistance needed from caregiver at current time, Ongoing telemetry monitoring, Cog status, Increased O2 via NC from pts baseline, Trending lab values, Continuous pulse oximetry monitoring . Pt currently requires mod A for bed mobility, mod A for transfers with HHA and mod A for ambulating 10'x2 with HHA. Pt is limited by pain and deficits in strength, endurance, balance, mobility and activity tolerance limiting their ability to be independent at this time and safely manage at home. Pt would benefit from continued skilled acute care PT services to address impairments and promote functional independence. Recommend level 2 resources to improve mobility and promote PLOF. The patient's AM-PAC Basic Mobility Inpatient Short Form Raw Score is 13. A Raw score of less than 16 suggests the patient may benefit from discharge to post-acute rehabilitation services. Please also refer to the recommendation of the Physical Therapist for safe discharge planning.  Pt left upright in chair with chair alarm donned, call bell and personal items within reach and all needs met.   Barriers to Discharge Inaccessible home environment;Decreased caregiver support   Goals   Patient Goals to feel better   STG Expiration Date 07/10/25   Short Term Goal #1 In 14 days pt will complete bed mobility I to decrease caregiver burden and increase independence. Pt will  complete transfers I to promote safety and independence. Pt will ambulate 300' at mod I to promote safe access to home and community. Pt will negotiate 12 steps with S to promote safe access to home and community. Pt will improve dynamic balance by 1/2 grade to decrease falls risk. Pt will improve b/l LE strength by 1/2 grade to improve efficiency of transfers and ambulation.   PT Treatment Day 0   Plan   Treatment/Interventions Functional transfer training;LE strengthening/ROM;Therapeutic exercise;Elevations;Endurance training;Equipment eval/education;Patient/family training;Bed mobility;Gait training;Continued evaluation;Cognitive reorientation;Compensatory technique education;Spoke to nursing;OT   PT Frequency 3-5x/wk   Discharge Recommendation   Rehab Resource Intensity Level, PT II (Moderate Resource Intensity)  (pending progress)   Equipment Recommended   (TBD with further mobility)   AM-PAC Basic Mobility Inpatient   Turning in Flat Bed Without Bedrails 3   Lying on Back to Sitting on Edge of Flat Bed Without Bedrails 2   Moving Bed to Chair 2   Standing Up From Chair Using Arms 2   Walk in Room 2   Climb 3-5 Stairs With Railing 2   Basic Mobility Inpatient Raw Score 13   Basic Mobility Standardized Score 33.99   [facility] Highest Level Of Mobility   [facility] Goal 4: Move to chair/commode   -[facility] Achieved 6: Walk 10 steps or more   Modified [place] Scale   Modified [place] Scale 4   Halie Marley DPT         [1]   Past Medical History:  Diagnosis Date    Glaucoma     Hypertension    [2]   Past Surgical History:  Procedure Laterality Date    BREAST LUMPECTOMY      HYSTERECTOMY        none